# Patient Record
Sex: FEMALE | Race: BLACK OR AFRICAN AMERICAN | NOT HISPANIC OR LATINO | URBAN - METROPOLITAN AREA
[De-identification: names, ages, dates, MRNs, and addresses within clinical notes are randomized per-mention and may not be internally consistent; named-entity substitution may affect disease eponyms.]

---

## 2024-07-31 ENCOUNTER — APPOINTMENT (RX ONLY)
Dept: URBAN - METROPOLITAN AREA CLINIC 1 | Facility: CLINIC | Age: 54
Setting detail: DERMATOLOGY
End: 2024-07-31

## 2024-07-31 DIAGNOSIS — L65.9 NONSCARRING HAIR LOSS, UNSPECIFIED: ICD-10-CM | Status: INADEQUATELY CONTROLLED

## 2024-07-31 PROCEDURE — ? ADDITIONAL NOTES

## 2024-07-31 PROCEDURE — ? COUNSELING

## 2024-07-31 PROCEDURE — 99202 OFFICE O/P NEW SF 15 MIN: CPT

## 2024-07-31 ASSESSMENT — LOCATION SIMPLE DESCRIPTION DERM
LOCATION SIMPLE: SCALP
LOCATION SIMPLE: RIGHT SCALP

## 2024-07-31 ASSESSMENT — LOCATION DETAILED DESCRIPTION DERM
LOCATION DETAILED: RIGHT MEDIAL FRONTAL SCALP
LOCATION DETAILED: LEFT SUPERIOR PARIETAL SCALP

## 2024-07-31 ASSESSMENT — LOCATION ZONE DERM: LOCATION ZONE: SCALP

## 2024-07-31 NOTE — HPI: HAIR LOSS
Previous Labs: Yes
When Were The Labs Drawn? (Drawn...): 6 months
What Hair Products Do You Use?: Aniaicl moisturizer recovery \\n Hipio it’s for hair thinning shampoo
Additional History: Pt wanted a second opinion \\n\\nPr states Sjögren’s syndrome diagnose 2018 pt states after diagnosis she noticed her hair thinning and less volume. Pt states fluocinonide .05 %3 x a week and minoxidil 2.5 half of pill daily pt has a different dermatologist that prescribed her the medication. Pt was getting injections since 2020 last injections was 3 months ago.  Pt states she has low vitamin D takes supplements for it.

## 2024-07-31 NOTE — PROCEDURE: ADDITIONAL NOTES
Render Risk Assessment In Note?: no
Detail Level: Generalized
Additional Notes: NATHALIA recommended pt to keep the treatment going with her dermatologist she was seeing before. Pt was here for second opinion.